# Patient Record
(demographics unavailable — no encounter records)

---

## 2025-04-29 NOTE — PHYSICAL EXAM

## 2025-04-29 NOTE — END OF VISIT
MEDICARE WELLNESS VISIT + NOTE    CHIEF COMPLAINT:  Nettie Araya presents for her Subsequent Annual Medicare Wellness Visit.   Her additional complaints or concerns are addressed below.      Patient Care Team:  Kaden Barbour MD as PCP - General (Family Practice)  PILAR Jain as Nurse Practitioner (Nurse Practitioner)  Mallika Bo MD as Gynecologist (Female Pelvic Medicine and Reconstructive Surgery)        Patient Active Problem List   Diagnosis   • HLD (hyperlipidemia)   • HTN (hypertension)   • Hypothyroidism   • Stress incontinence, female   • Vitamin D deficiency   • Varicose veins of both lower extremities with inflammation   • Osteopenia   • Major depressive disorder   • Cardiac arrhythmia   • Chronic right shoulder pain         Past Medical History:   Diagnosis Date   • Cardiac arrhythmia 06/15/2010    RBBB   • HLD (hyperlipidemia)    • HTN (hypertension)    • Hypothyroidism    • Impingement syndrome, shoulder, right    • Major depressive disorder    • Osteopenia    • Stress incontinence, female    • Varicose veins of both lower extremities with inflammation    • Vitamin D deficiency          Past Surgical History:   Procedure Laterality Date   • ----------mammogram----------  10/30/2020   • Bunionectomy      and hammertoe repair   • Colonoscopy diagnostic  10/09/2014    negative exam, mildy suboptimal exam/poor prep   • Colonoscopy w biopsy  12/05/2019    polyps, diverticulosis, hemorrhoids, redundant colon, fair prep   • Tubal ligation      age 45         Social History     Tobacco Use   • Smoking status: Never Smoker   • Smokeless tobacco: Never Used   Vaping Use   • Vaping Use: never used   Substance Use Topics   • Alcohol use: Yes     Comment: occasionally    • Drug use: Never     Family History   Problem Relation Age of Onset   • Diabetes Mother    • Dementia/Alzheimers Mother    • Coronary Artery Disease Mother         CABG age 67   • Parkinsonism Father    •  Jeremy-Parkinson-White Sister    • Diabetes Brother    • Depression Sister    • Syncope Sister         vasovagal   • Heart Brother         rhythm issues         Current Outpatient Medications   Medication Sig Dispense Refill   • buPROPion (WELLBUTRIN SR) 100 MG 12 hr tablet TAKE 2 TABLETS TWICE A  tablet 1   • Synthroid 112 MCG tablet TAKE 1 TABLET DAILY BEFORE BREAKFAST 90 tablet 0   • losartan (COZAAR) 100 MG tablet TAKE 1 TABLET NIGHTLY 90 tablet 1   • BIOTIN PO Take by mouth daily.     • hydrochlorothiazide (MICROZIDE) 12.5 MG capsule TAKE 1 CAPSULE EVERY       MORNING 90 capsule 1   • Calcium Carbonate-Vit D-Min (CALCIUM 1200 PO) Take 1 tablet by mouth daily.     • Misc Natural Products (OSTEO BI-FLEX JOINT SHIELD) Tab Take by mouth daily.     • Omega-3 Fatty Acids (FISH OIL) 1000 MG capsule Take 1 g by mouth daily.     • Cholecalciferol (VITAMIN D3 PO) Take by mouth daily.     • Coenzyme Q10 100 MG Cap Take 100 mg by mouth daily.       No current facility-administered medications for this visit.        The following items on the Medicare Health Risk Assessment were found to be positive  7b.) Do you feel unsteady when standing or walking?: Yes     7c.) Do you worry about falling?: Yes     11b.) Bowel control problems: Often         Vision and Hearing screens: No exam data present    Advance Directive:   The patient has the following documents:  Power of  for Health Care  Living Will    Cognitive/Functional Status: no evidence of cognitive dysfunction by direct observation    Opioid Review: Nettie is not taking opioid medications.    Recent PHQ 2/9 Score:    PHQ 2:  Date Adult PHQ 2 Score Adult PHQ 2 Interpretation   10/7/2021 0 No further screening needed       PHQ 9:       DEPRESSION ASSESSMENT/PLAN:  Depression screening is negative no further plan needed.     Body mass index is 29.6 kg/m².    BMI ASSESSMENT/PLAN:  Patient is overweight.    Journal food intake daily        See Patient  [Time Spent: ___ minutes] : I have spent [unfilled] minutes of time on the encounter which excludes teaching and separately reported services. Instructions section.   Return in about 6 weeks (around 11/18/2021) for MULTIPLE MEDICAL ISSUES.      OUTPATIENT PROGRESS NOTE    Subjective   Chief Complaint Medicare Wellness Visit (Presents for Subsequent Medicare Exam.)    HPI     Patient has given consent to record this visit for documentation in their clinical record.    Historian: Self    Need for influenza vaccination:  Due for influenza vaccination.    Primary hypertension:  Currently, on Losartan. The blood pressure measured in today’s visit by MA was normal.    Recurrent major depressive disorder, in partial remission (CMS/[facility]):  Currently, dealing with family issues. Per patient, she had to take her 50-year-old son to rehab for alcoholism a week ago. States her son's wife left him, and his kids are moving too as they decided to get separate apartment because of this. States her daughter-in-law had been going to [facility] for years.     Left medial knee pain:  States she twisted her left knee, and heard pop 6 months ago. However, it got better, and then about 3 months ago it happened again. Is not getting better. Aggravating factors are climbing, and descending stairs.    Low sodium level:  Blood work done recently revealed low sodium level.    Additional comments:  Dental screening: Due.  Exercise: She has not been exercising because of her knee issues.  Immunization: Up-to-date with COVID-19 vaccination series.  She recently underwent cataract surgery by Dr. Sanders last week.    Medications  Medications were reviewed and updated today.    Histories  I have personally reviewed and updated the patient's past medical, past surgical, family and social histories during today's visit.    Review of Systems  HENT: As Per HPI. No ear issues.  Cardiovascular: As Per HPI.  Genitourinary: As Per HPI. No renal pain.  Musculoskeletal: As Per HPI. No neck pain, or discomfort.  Allergic/Immunologic: As Per HPI.  Psychiatric/Behavioral: As Per HPI.    Objective    Visit Vitals  /79 (BP Location: LUE - Left upper extremity, Patient Position: Sitting, Cuff Size: Regular)   Pulse 68   Temp 97.3 °F (36.3 °C) (Temporal)   Resp 18   Ht 5' 3.5\" (1.613 m)   Wt 77 kg (169 lb 12.1 oz)   SpO2 98%   BMI 29.60 kg/m²     Physical Exam  Constitutional: In no acute distress. Well-developed  Eyes: The conjunctiva exhibited no abnormalities. The sclera was normal   Psychiatric: Oriented to time, place, and person. The mood was normal. The affect was normal. The memory was unimpaired.   Skin: Skin moisture and turgor normal.  Pulmonary: no respiratory distress, normal respiratory rate and effort and no accessory muscle use. breath sounds clear to auscultation bilaterally.   Cardiovascular: normal rate, no murmurs were heard, regular rhythm, normal S1 and normal S2. edema was not present in the lower extremities.   Abdomen: soft, nontender, nondistended, normal bowel sounds and no abdominal mass. no hepatomegaly and no splenomegaly. no umbilical hernia was discovered.     Laboratory  · I have reviewed the pertinent laboratory tests.     Imaging  · I have reviewed the pertinent imaging study reports.     Assessment & Plan   Diagnoses and associated orders for this visit:  1. Need for influenza vaccination  -     INFLUENZA QUADRIVALENT HIGH DOSE PRES FREE 0.7 ML VACC,IM  2. Mixed hyperlipidemia  3. Primary hypertension  -     Basic Metabolic Panel  4. Varicose veins of both lower extremities with inflammation  5. Vitamin D deficiency  6. Acquired hypothyroidism  7. Stress incontinence, female  8. Recurrent major depressive disorder, in partial remission (CMS/HCC)  -     SERVICE TO BEHAVIORAL HEALTH  9. Osteopenia of multiple sites  10. Left medial knee pain  -     XR Knee 3 View Left  11. Hyponatremia  -     Basic Metabolic Panel  12. Situational anxiety  -     SERVICE TO BEHAVIORAL HEALTH      Need for influenza vaccination:  Administered influenza vaccine in the office today without any  complication. Benefit, guidelines, side effects explained.    Mixed hyperlipidemia:  Continue current management.    Primary hypertension:  Controlled.  Reviewed and discussed previous reports.  Ordered basic metabolic panel; refer orders.  Continue Losartan at current dosage.    Varicose veins of both lower extremities with inflammation:  Continue current management.    Vitamin D deficiency:  Continue current management.    Acquired hypothyroidism:  Continue current management.    Stress incontinence, female:  Continue current management.    Recurrent major depressive disorder, in partial remission (CMS/HCC); Situational anxiety:  List for behavioral health therapy clinics were provided, in accordance with her insurance.Advised that it would be beneficial to talk through about her issues with someone.  Supportive listening and counseling provided.  Referred to Behavioral Health.    Osteopenia of multiple sites:  Continue current management.    Left medial knee pain:  The clinical presentation is suggestive of Pes anserine bursitis.  Ordered XR left knee; refer orders.  Discussed the significance of few sessions with physical therapist.  Did talk that will make referral for Orthopedics if needed.  Patient information handout was provided.    Hyponatremia:  Will continue to monitor.  Reviewed and discussed previous reports.  Ordered basic metabolic panel; refer orders.  Recommended staying hydrated, 2 liters a day.  We will check labs and make further recommendations as indicated.    Additional plan details:  Dental screening: Recommended following up with a dentist regularly.  All her questions were answered.    Follow-up in 6 weeks, or sooner if needed.    Refer to orders.  Medical compliance with plan discussed and risks of non-compliance reviewed.  Patient education completed on disease process, etiology & prognosis.  Proper usage and side effects of medications reviewed & discussed.  Patient understands and  agrees with the plan.  Return to clinic as clinically indicated as discussed with patient who verbalized understanding of the plan and is in agreement with the plan.    I, Chaya Heck, have created a visit summary document based on the audio recording between Kaden Barbour MD and this patient for the physician to review, edit as needed, and authenticate.  Creation Date: 10/9/2021    I have reviewed and edited the visit summary above and attest that it is accurate.

## 2025-04-29 NOTE — HISTORY OF PRESENT ILLNESS
[FreeTextEntry1] : 39 yo M who presents for evaluation of rectal bleeding.  Patient denies nausea, vomiting, diarrhea, constipation, abdominal pain.  Also denies coffee-ground emesis, hematemesis, hematochezia, melena.  Denies prior travel.  Denies weight loss.  Denies family history of malignancy.  BRB per rectum, intermittent when wiping and mixed with stool

## 2025-04-29 NOTE — ASSESSMENT
[FreeTextEntry1] : rectal bleeding:  Discussed risks and benefits of procedure including infection, bleeding, perforation.  Patient is in agreement with plan for procedure.  Preparation sent to pharmacy.